# Patient Record
Sex: FEMALE | Race: BLACK OR AFRICAN AMERICAN
[De-identification: names, ages, dates, MRNs, and addresses within clinical notes are randomized per-mention and may not be internally consistent; named-entity substitution may affect disease eponyms.]

---

## 2020-07-30 ENCOUNTER — HOSPITAL ENCOUNTER (EMERGENCY)
Dept: HOSPITAL 63 - ER | Age: 25
Discharge: HOME | End: 2020-07-30
Payer: COMMERCIAL

## 2020-07-30 VITALS — WEIGHT: 188.72 LBS | BODY MASS INDEX: 35.63 KG/M2 | HEIGHT: 61 IN

## 2020-07-30 VITALS — SYSTOLIC BLOOD PRESSURE: 139 MMHG | DIASTOLIC BLOOD PRESSURE: 70 MMHG

## 2020-07-30 DIAGNOSIS — B96.89: ICD-10-CM

## 2020-07-30 DIAGNOSIS — N93.9: ICD-10-CM

## 2020-07-30 DIAGNOSIS — N76.0: ICD-10-CM

## 2020-07-30 DIAGNOSIS — Z88.8: ICD-10-CM

## 2020-07-30 DIAGNOSIS — N39.0: Primary | ICD-10-CM

## 2020-07-30 LAB
APTT PPP: YELLOW S
BACTERIA #/AREA URNS HPF: (no result) /HPF
BILIRUB UR QL STRIP: (no result)
FIBRINOGEN PPP-MCNC: (no result) MG/DL
GLUCOSE UR STRIP-MCNC: (no result) MG/DL
NITRITE UR QL STRIP: (no result)
RBC #/AREA URNS HPF: (no result) /HPF (ref 0–2)
SP GR UR STRIP: >=1.03
SQUAMOUS #/AREA URNS LPF: (no result) /LPF
UROBILINOGEN UR-MCNC: 0.2 MG/DL
WBC #/AREA URNS HPF: (no result) /HPF (ref 0–4)

## 2020-07-30 PROCEDURE — 81001 URINALYSIS AUTO W/SCOPE: CPT

## 2020-07-30 PROCEDURE — 87491 CHLMYD TRACH DNA AMP PROBE: CPT

## 2020-07-30 PROCEDURE — 87086 URINE CULTURE/COLONY COUNT: CPT

## 2020-07-30 PROCEDURE — 74177 CT ABD & PELVIS W/CONTRAST: CPT

## 2020-07-30 PROCEDURE — 87591 N.GONORRHOEAE DNA AMP PROB: CPT

## 2020-07-30 PROCEDURE — 81025 URINE PREGNANCY TEST: CPT

## 2020-07-30 PROCEDURE — 99285 EMERGENCY DEPT VISIT HI MDM: CPT

## 2020-07-30 PROCEDURE — 96372 THER/PROPH/DIAG INJ SC/IM: CPT

## 2020-07-30 RX ADMIN — IOHEXOL ONE ML: 300 INJECTION, SOLUTION INTRAVENOUS at 02:43

## 2020-07-30 RX ADMIN — AZITHROMYCIN ONE MG: 250 TABLET, FILM COATED ORAL at 03:27

## 2020-07-30 RX ADMIN — ONDANSETRON ONE MG: 4 TABLET, ORALLY DISINTEGRATING ORAL at 03:26

## 2020-07-30 RX ADMIN — CEFTRIAXONE ONE MG: 250 INJECTION, POWDER, FOR SOLUTION INTRAMUSCULAR; INTRAVENOUS at 03:26

## 2020-07-30 RX ADMIN — IOHEXOL ONE ML: 240 INJECTION, SOLUTION INTRATHECAL; INTRAVASCULAR; INTRAVENOUS; ORAL at 02:43
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
## 2020-07-30 NOTE — RAD
CT abdomen and pelvis with contrast:

 

Reason for examination: Abdominal pain and distention with weight gain. 

Vaginal pain with bleeding.

 

Helical images were obtained through the abdomen and pelvis with 

intravenous administration of 75 cc Omnipaque 300. Reconstruction was 

performed in sagittal and coronal planes.

 

Exposure: One or more of the following individualized dose reduction 

techniques were utilized for this examination:  1. Automated exposure 

control  2. Adjustment of the mA and/or kV according to patient size  3. 

Use of iterative reconstruction technique.

 

The lung bases are clear. The heart size is normal with no pericardial 

effusion.

 

No abnormality seen at the liver, spleen, adrenal glands, pancreas or 

gallbladder. The abdominal aorta and inferior vena cava show no acute 

abnormalities. The kidneys show no renal masses, renal calculi, 

hydronephrosis or evidence of obstructive uropathy. No abnormality seen in

the stomach. The small intestinal tract shows no abnormal dilatation, wall

thickening or obstruction. There is no evidence of diverticulosis or 

diverticulitis or colitis. No abnormality seen at the appendix.

 

No abnormality seen at the bladder or uterus. There is a probable 1.5 cm 

cyst at the right ovary. No abnormality seen at the left ovary. There is a

small amount of free fluid in the pelvic cul-de-sac. No free air is 

present. No acute bony abnormalities are seen.

 

IMPRESSION:

 

Small 1.5 cm cystic appearing lesion in the right ovary. Small amount of 

free fluid in the pelvic cul-de-sac. No other significant abnormality seen

in the abdomen or pelvis.

 

Electronically signed by: Roxanne Huerta MD (7/30/2020 3:07 AM) [id number]